# Patient Record
Sex: MALE | URBAN - METROPOLITAN AREA
[De-identification: names, ages, dates, MRNs, and addresses within clinical notes are randomized per-mention and may not be internally consistent; named-entity substitution may affect disease eponyms.]

---

## 2021-12-10 ENCOUNTER — IMPORTED ENCOUNTER (OUTPATIENT)
Dept: URBAN - METROPOLITAN AREA CLINIC 1 | Facility: CLINIC | Age: 46
End: 2021-12-10

## 2021-12-10 PROBLEM — H52.223: Noted: 2021-12-10

## 2021-12-10 PROBLEM — H52.4: Noted: 2021-12-10

## 2021-12-10 PROBLEM — H52.13: Noted: 2021-12-10

## 2021-12-10 PROCEDURE — S0620 ROUTINE OPHTHALMOLOGICAL EXA: HCPCS

## 2021-12-10 NOTE — PATIENT DISCUSSION
1. Myopia w/ Astigmatism OU -- Rx was given for correction if indicated and requested. 2. Presbyopia -- 3. Glaucoma Suspect OU (CD:0.40/0.55) -- (-) FHx AA. Consider baseline testing in the future. 4.  Allergic Conjunctivitis OU -- Recommend the use of Pataday OTC BID OU PRN itching. Return for an appointment in 1 year 36 with Dr. Mylene Tillman.

## 2022-04-02 ASSESSMENT — VISUAL ACUITY
OD_CC: 20/30
OS_SC: J2
OD_SC: J2
OS_CC: 20/25-1

## 2022-04-02 ASSESSMENT — TONOMETRY
OD_IOP_MMHG: 12
OS_IOP_MMHG: 14